# Patient Record
Sex: MALE | Race: WHITE | NOT HISPANIC OR LATINO | Employment: OTHER | URBAN - METROPOLITAN AREA
[De-identification: names, ages, dates, MRNs, and addresses within clinical notes are randomized per-mention and may not be internally consistent; named-entity substitution may affect disease eponyms.]

---

## 2023-10-06 PROBLEM — D22.39 MELANOCYTIC NEVI OF OTHER PARTS OF FACE: Status: ACTIVE | Noted: 2023-08-01

## 2023-10-06 PROBLEM — B07.9 VIRAL WART, UNSPECIFIED: Status: ACTIVE | Noted: 2023-08-01

## 2023-10-06 PROBLEM — D48.5 NEOPLASM OF UNCERTAIN BEHAVIOR OF SKIN: Status: ACTIVE | Noted: 2023-08-01

## 2023-10-06 RX ORDER — IMIQUIMOD 12.5 MG/.25G
1 CREAM TOPICAL
COMMUNITY
Start: 2020-07-07

## 2023-10-10 ENCOUNTER — APPOINTMENT (OUTPATIENT)
Dept: DERMATOLOGY | Facility: CLINIC | Age: 63
End: 2023-10-10
Payer: COMMERCIAL

## 2023-10-11 ENCOUNTER — TELEPHONE (OUTPATIENT)
Dept: DERMATOLOGY | Facility: CLINIC | Age: 63
End: 2023-10-11
Payer: COMMERCIAL

## 2023-10-30 NOTE — PROGRESS NOTES
Subjective     Duncan Kay is a 63 y.o. male who presents for the following: Wart (Here for additional Vbeam treatment. Patient reports improvement since last visit. ).   Acral keratosis vs viral warts, responding well to v-beam treatments.   He is planned to see genetics for workup of possible cowdens syndrome.     Review of Systems:  No other skin or systemic complaints other than what is documented elsewhere in the note.    The following portions of the chart were reviewed this encounter and updated as appropriate:          Skin Cancer History  No skin cancer on file.      Specialty Problems          Dermatology Problems    Melanocytic nevi of other parts of face    Neoplasm of uncertain behavior of skin    Viral wart, unspecified       Previous V-beam Treatment  Date: August 1, 2023  Treatment #:  16  Location: hands  Spot Size: 7 mm  Energy:  16.25  j/cm2  Pulse Width:  1.5  msec  DCD Spray:  OFF  DCD Delay:  OFF  Number of Pulses: 10  Complications: No  Bruising: Demonstrates anticipated reactions  Cold Pack after treatment: Yes     Objective   Well appearing patient in no apparent distress; mood and affect are within normal limits.    A focused skin examination was performed. All findings within normal limits unless otherwise noted below.    Assessment/Plan   1. Other viral warts (15)  Left Hand - Anterior (9), Left Hand - Posterior (3), Right Hand - Posterior (3)  Verrucous papules    Follow Up In Dermatology - Established Patient - Left Hand - Anterior, Left Hand - Posterior    Destr of lesion - Left Hand - Anterior, Left Hand - Posterior, Right Hand - Posterior  Complexity: simple    Destruction method: laser removal    Informed consent: discussed and consent obtained    Eye shield: goggles    Laser type:  V-beam  Fluence (J/sq cm):  16  Spot size (mm):  7  Pulse duration (ms):  1.5  Pulse stacking: Yes      Pulse stacking: No    Outcome: patient tolerated procedure well with no complications     Post-procedure details: wound care instructions given

## 2023-10-31 ENCOUNTER — PROCEDURE VISIT (OUTPATIENT)
Dept: DERMATOLOGY | Facility: CLINIC | Age: 63
End: 2023-10-31
Payer: COMMERCIAL

## 2023-10-31 DIAGNOSIS — B07.8 OTHER VIRAL WARTS: Primary | ICD-10-CM

## 2023-10-31 PROCEDURE — 17111 DESTRUCTION B9 LESIONS 15/>: CPT | Performed by: STUDENT IN AN ORGANIZED HEALTH CARE EDUCATION/TRAINING PROGRAM

## 2023-10-31 RX ORDER — FLUTICASONE PROPIONATE AND SALMETEROL XINAFOATE 115; 21 UG/1; UG/1
2 AEROSOL, METERED RESPIRATORY (INHALATION) 2 TIMES DAILY
COMMUNITY
Start: 2023-07-21 | End: 2023-11-18

## 2023-10-31 RX ORDER — FLUTICASONE PROPIONATE 44 UG/1
AEROSOL, METERED RESPIRATORY (INHALATION)
COMMUNITY

## 2023-10-31 RX ORDER — OMEPRAZOLE 10 MG/1
10 CAPSULE, DELAYED RELEASE ORAL
COMMUNITY
Start: 2014-10-13

## 2023-10-31 RX ORDER — FLUTICASONE PROPIONATE 50 MCG
2 SPRAY, SUSPENSION (ML) NASAL DAILY
COMMUNITY
Start: 2023-05-04

## 2023-10-31 ASSESSMENT — DERMATOLOGY PATIENT ASSESSMENT
HAVE YOU HAD OR DO YOU HAVE A STAPH INFECTION: NO
HAVE YOU HAD OR DO YOU HAVE VASCULAR DISEASE: NO
DO YOU USE A TANNING BED: NO
DO YOU HAVE ANY NEW OR CHANGING LESIONS: NO
DO YOU USE SUNSCREEN: OCCASIONALLY
ARE YOU AN ORGAN TRANSPLANT RECIPIENT: NO

## 2023-10-31 ASSESSMENT — DERMATOLOGY QUALITY OF LIFE (QOL) ASSESSMENT
RATE HOW EMOTIONALLY BOTHERED YOU ARE BY YOUR SKIN PROBLEM (FOR EXAMPLE, WORRY, EMBARRASSMENT, FRUSTRATION): 0 - NEVER BOTHERED
WHAT SINGLE SKIN CONDITION LISTED BELOW IS THE PATIENT ANSWERING THE QUALITY-OF-LIFE ASSESSMENT QUESTIONS ABOUT: NONE OF THE ABOVE
RATE HOW BOTHERED YOU ARE BY EFFECTS OF YOUR SKIN PROBLEMS ON YOUR ACTIVITIES (EG, GOING OUT, ACCOMPLISHING WHAT YOU WANT, WORK ACTIVITIES OR YOUR RELATIONSHIPS WITH OTHERS): 0 - NEVER BOTHERED
DATE THE QUALITY-OF-LIFE ASSESSMENT WAS COMPLETED: 66778
ARE THERE EXCLUSIONS OR EXCEPTIONS FOR THE QUALITY OF LIFE ASSESSMENT: NO
RATE HOW BOTHERED YOU ARE BY SYMPTOMS OF YOUR SKIN PROBLEM (EG, ITCHING, STINGING BURNING, HURTING OR SKIN IRRITATION): 0 - NEVER BOTHERED

## 2023-10-31 ASSESSMENT — PATIENT GLOBAL ASSESSMENT (PGA): PATIENT GLOBAL ASSESSMENT: PATIENT GLOBAL ASSESSMENT:  1 - CLEAR

## 2023-10-31 ASSESSMENT — ITCH NUMERIC RATING SCALE: HOW SEVERE IS YOUR ITCHING?: 0

## 2023-11-30 ENCOUNTER — PROCEDURE VISIT (OUTPATIENT)
Dept: DERMATOLOGY | Facility: CLINIC | Age: 63
End: 2023-11-30
Payer: COMMERCIAL

## 2023-11-30 ENCOUNTER — APPOINTMENT (OUTPATIENT)
Dept: LAB | Facility: LAB | Age: 63
End: 2023-11-30
Payer: COMMERCIAL

## 2023-11-30 DIAGNOSIS — B07.9 VIRAL WARTS, UNSPECIFIED TYPE: ICD-10-CM

## 2023-11-30 DIAGNOSIS — B07.8 OTHER VIRAL WARTS: ICD-10-CM

## 2023-11-30 PROCEDURE — 17110 DESTRUCTION B9 LES UP TO 14: CPT | Performed by: STUDENT IN AN ORGANIZED HEALTH CARE EDUCATION/TRAINING PROGRAM

## 2023-11-30 NOTE — PROGRESS NOTES
Subjective     Duncan Kay is a 63 y.o. male who presents for the following: Wart (Follow up warts on b/l hands.  Has noticed improvement from laser treatments.).   Acral keratosis vs viral warts, responding well to v-beam treatments.     Review of Systems:  No other skin or systemic complaints other than what is documented elsewhere in the note.    The following portions of the chart were reviewed this encounter and updated as appropriate:          Skin Cancer History  No skin cancer on file.      Specialty Problems          Dermatology Problems    Melanocytic nevi of other parts of face    Neoplasm of uncertain behavior of skin    Viral wart, unspecified       Previous V-beam Treatment  Location: hands  Spot Size: 7 mm  Energy:  16.25  j/cm2  Pulse Width:  1.5  msec  DCD Spray:  OFF  DCD Delay:  OFF  Number of Pulses: 10  Complications: No  Bruising: Demonstrates anticipated reactions  Cold Pack after treatment: Yes     Objective   Well appearing patient in no apparent distress; mood and affect are within normal limits.    A focused skin examination was performed. All findings within normal limits unless otherwise noted below.    Assessment/Plan   1. Other viral warts    Related Procedures  Follow Up In Dermatology - Established Patient  Follow Up In Dermatology - Established Patient    2. Viral warts, unspecified type (2)  Left Dorsal Hand; Right Dorsal Hand    Destr of lesion - Left Dorsal Hand, Right Dorsal Hand  Complexity: simple    Destruction method: laser removal    Informed consent: discussed and consent obtained    Timeout:  patient name, date of birth, surgical site, and procedure verified  Eye shield: goggles    Laser type:  V Beam  Fluence (J/sq cm):  16  Spot size (mm):  7  Pulse duration (ms):  1.5  Outcome: patient tolerated procedure well with no complications    Post-procedure details: wound care instructions given    Additional details:  Pulses: 15

## 2024-02-22 ENCOUNTER — OFFICE VISIT (OUTPATIENT)
Dept: DERMATOLOGY | Facility: CLINIC | Age: 64
End: 2024-02-22
Payer: COMMERCIAL

## 2024-02-22 DIAGNOSIS — B07.8 OTHER VIRAL WARTS: ICD-10-CM

## 2024-02-22 PROCEDURE — 17110 DESTRUCTION B9 LES UP TO 14: CPT | Performed by: STUDENT IN AN ORGANIZED HEALTH CARE EDUCATION/TRAINING PROGRAM

## 2024-02-22 PROCEDURE — 1036F TOBACCO NON-USER: CPT | Performed by: STUDENT IN AN ORGANIZED HEALTH CARE EDUCATION/TRAINING PROGRAM

## 2024-02-22 NOTE — PROGRESS NOTES
Subjective     Duncan Kay is a 63 y.o. male who presents for the following: Wart (LV: 11/30/23: warts on b/l hands R > L.  Improved greatly with laser tx's.).   Acral keratosis vs viral warts, responding well to v-beam treatments.     Review of Systems:  No other skin or systemic complaints other than what is documented elsewhere in the note.    The following portions of the chart were reviewed this encounter and updated as appropriate:          Skin Cancer History  No skin cancer on file.      Specialty Problems          Dermatology Problems    Melanocytic nevi of other parts of face    Neoplasm of uncertain behavior of skin    Viral wart, unspecified       Previous V-beam Treatment  Location: hands  Spot Size: 7 mm  Energy:  16.25  j/cm2  Pulse Width:  1.5  msec  DCD Spray:  OFF  DCD Delay:  OFF  Number of Pulses: 10  Complications: No  Bruising: Demonstrates anticipated reactions  Cold Pack after treatment: Yes     Objective   Well appearing patient in no apparent distress; mood and affect are within normal limits.    A focused skin examination was performed. All findings within normal limits unless otherwise noted below.    Assessment/Plan   1. Other viral warts (4)  Left Dorsal Hand, Right 5th Finger Metacarpophalangeal Joint, Right 5th Metacarpophalangeal Region, Right Hypothenar [place]  Verrucous papules, thinner than prior exam    Destr of lesion - Left Dorsal Hand, Right 5th Finger Metacarpophalangeal Joint, Right 5th Metacarpophalangeal Region, Right Hypothenar [place]  Complexity: simple    Destruction method: laser removal    Informed consent: discussed and consent obtained    Eye shield: goggles    Laser type:  V-beam  Fluence (J/sq cm):  16.25  Spot size (mm):  7  Pulse duration (ms):  1.5  Pulse stacking: Yes      Pulse stacking: No    Outcome: patient tolerated procedure well with no complications    Post-procedure details: wound care instructions given      Related Procedures  Follow Up In  Dermatology - Established Patient  Follow Up In Dermatology - Established Patient

## 2024-04-01 NOTE — PROGRESS NOTES
Subjective   Duncan Kay is a 63 y.o. male who presents for the following: Wart (LV: 2/22/24: Acral Keratosis vs viral warts.  Pt has had much improvement with vbeam treatments.)    The following portions of the chart were reviewed this encounter and updated as appropriate:         Review of Systems: No other skin or systemic complaints.    Objective   Well appearing patient in no apparent distress; mood and affect are within normal limits.    A focused examination was performed including upper extremities, including the arms, hands, fingers, and fingernails. All findings within normal limits unless otherwise noted below.      Assessment/Plan   Other viral warts (4)  Left Dorsal Hand; Right Thenar [place]; Right 5th Finger Metacarpophalangeal Joint; Right 5th Metacarpophalangeal Region    Follow Up In Dermatology - Established Patient - Left Dorsal Hand, Right 5th Finger Metacarpophalangeal Joint, Right 5th Metacarpophalangeal Region, Right Thenar [place]    Destr of lesion - Left Dorsal Hand, Right 5th Finger Metacarpophalangeal Joint, Right 5th Metacarpophalangeal Region, Right Thenar [place]  Complexity: simple    Destruction method: laser removal    Informed consent: discussed and consent obtained    Timeout:  patient name, date of birth, surgical site, and procedure verified  Eye shield: goggles    Laser type:  VBeam  Fluence (J/sq cm):  16  Spot size (mm):  7  Pulse duration (ms):  1.5  Pulse stacking: Yes      Pulse stacking: No    Outcome: patient tolerated procedure well with no complications    Post-procedure details: wound care instructions given    Additional details:  16 pulses    Scribe Attestation  By signing my name below, Edith MANCERA LPN , Scribe   attest that this documentation has been prepared under the direction and in the presence of Cricket Ziegler MD.

## 2024-04-02 ENCOUNTER — OFFICE VISIT (OUTPATIENT)
Dept: DERMATOLOGY | Facility: CLINIC | Age: 64
End: 2024-04-02
Payer: COMMERCIAL

## 2024-04-02 DIAGNOSIS — B07.8 OTHER VIRAL WARTS: ICD-10-CM

## 2024-04-02 PROCEDURE — 17110 DESTRUCTION B9 LES UP TO 14: CPT | Performed by: STUDENT IN AN ORGANIZED HEALTH CARE EDUCATION/TRAINING PROGRAM

## 2024-07-08 NOTE — PROGRESS NOTES
Subjective   Duncan Kay is a 63 y.o. male who presents for the following: Follow-up (LV: 4/2/24: Acral Keratosis vs viral warts.  Pt has had much improvement with vbeam treatments.) and Suspicious Skin Lesion (Right forehead.  Unsure how long this has been present but is not bothersome.)    The following portions of the chart were reviewed this encounter and updated as appropriate:         Review of Systems: No other skin or systemic complaints.    Objective   Well appearing patient in no apparent distress; mood and affect are within normal limits.    A focused examination was performed including upper extremities, including the arms, hands, fingers, and fingernails. All findings within normal limits unless otherwise noted below.    Right Forehead  Stuck on verrucous, tan-brown papule      Left Hand - Anterior, Right Hand - Anterior  Verrucous papules on acral surfaces of hands      Assessment/Plan   Seborrheic keratosis  Right Forehead    Reassured of benign nature of lesion    Other viral warts (2)  Left Hand - Anterior; Right Hand - Anterior    Reassured of benign nature of lesions  He would like repeat tx today    Follow Up In Dermatology - Established Patient - Left Hand - Anterior, Right Hand - Anterior    Destr of lesion - Left Hand - Anterior, Right Hand - Anterior  Complexity: simple    Destruction method: laser removal    Informed consent: discussed and consent obtained    Eye shield: goggles    Laser type:  V-beam  Fluence (J/sq cm):  16  Spot size (mm):  7  Pulse duration (ms):  1.5  Pulse stacking: Yes      Pulse stacking: No    Outcome: patient tolerated procedure well with no complications    Post-procedure details: wound care instructions given    Additional details:  16 pulses          RTC 4-6 weeks    Scribe Attestation  By signing my name below, Edith MANCERA LPN , Scribe   attest that this documentation has been prepared under the direction and in the presence of Cricket Ziegler MD.

## 2024-07-09 ENCOUNTER — APPOINTMENT (OUTPATIENT)
Dept: DERMATOLOGY | Facility: CLINIC | Age: 64
End: 2024-07-09
Payer: COMMERCIAL

## 2024-07-09 DIAGNOSIS — B07.8 OTHER VIRAL WARTS: ICD-10-CM

## 2024-07-09 DIAGNOSIS — L82.1 SEBORRHEIC KERATOSIS: Primary | ICD-10-CM

## 2024-07-09 PROCEDURE — 99213 OFFICE O/P EST LOW 20 MIN: CPT | Performed by: STUDENT IN AN ORGANIZED HEALTH CARE EDUCATION/TRAINING PROGRAM

## 2024-07-09 PROCEDURE — 17110 DESTRUCTION B9 LES UP TO 14: CPT | Performed by: STUDENT IN AN ORGANIZED HEALTH CARE EDUCATION/TRAINING PROGRAM

## 2024-07-09 ASSESSMENT — PATIENT GLOBAL ASSESSMENT (PGA): WHAT IS THE PGA: PATIENT GLOBAL ASSESSMENT:  1 - CLEAR

## 2024-07-09 ASSESSMENT — DERMATOLOGY QUALITY OF LIFE (QOL) ASSESSMENT
RATE HOW BOTHERED YOU ARE BY EFFECTS OF YOUR SKIN PROBLEMS ON YOUR ACTIVITIES (EG, GOING OUT, ACCOMPLISHING WHAT YOU WANT, WORK ACTIVITIES OR YOUR RELATIONSHIPS WITH OTHERS): 0 - NEVER BOTHERED
RATE HOW EMOTIONALLY BOTHERED YOU ARE BY YOUR SKIN PROBLEM (FOR EXAMPLE, WORRY, EMBARRASSMENT, FRUSTRATION): 0 - NEVER BOTHERED
WHAT SINGLE SKIN CONDITION LISTED BELOW IS THE PATIENT ANSWERING THE QUALITY-OF-LIFE ASSESSMENT QUESTIONS ABOUT: NONE OF THE ABOVE
RATE HOW EMOTIONALLY BOTHERED YOU ARE BY YOUR SKIN PROBLEM (FOR EXAMPLE, WORRY, EMBARRASSMENT, FRUSTRATION): 0 - NEVER BOTHERED
RATE HOW BOTHERED YOU ARE BY SYMPTOMS OF YOUR SKIN PROBLEM (EG, ITCHING, STINGING BURNING, HURTING OR SKIN IRRITATION): 0 - NEVER BOTHERED
RATE HOW BOTHERED YOU ARE BY EFFECTS OF YOUR SKIN PROBLEMS ON YOUR ACTIVITIES (EG, GOING OUT, ACCOMPLISHING WHAT YOU WANT, WORK ACTIVITIES OR YOUR RELATIONSHIPS WITH OTHERS): 0 - NEVER BOTHERED
WHAT SINGLE SKIN CONDITION LISTED BELOW IS THE PATIENT ANSWERING THE QUALITY-OF-LIFE ASSESSMENT QUESTIONS ABOUT: NONE OF THE ABOVE
DATE THE QUALITY-OF-LIFE ASSESSMENT WAS COMPLETED: 66939

## 2024-08-08 ENCOUNTER — APPOINTMENT (OUTPATIENT)
Dept: DERMATOLOGY | Facility: CLINIC | Age: 64
End: 2024-08-08
Payer: COMMERCIAL

## 2024-08-08 ENCOUNTER — TELEPHONE (OUTPATIENT)
Dept: DERMATOLOGY | Facility: CLINIC | Age: 64
End: 2024-08-08

## 2024-08-08 NOTE — TELEPHONE ENCOUNTER
LVM for pt to notify of power outage in [place].  Offered to move appointment to [facility] this afternoon or reschedule.  Phone number provided for office. Edith Pittman LPN

## 2024-08-08 NOTE — TELEPHONE ENCOUNTER
Spoke with pt and he prefers to reschedule appt in [place].  Rescheduled to Tuesday 9/3/24 at 1pm.  No further questions or concerns at this time.  Eidth Pittman LPN

## 2024-09-03 ENCOUNTER — APPOINTMENT (OUTPATIENT)
Dept: DERMATOLOGY | Facility: CLINIC | Age: 64
End: 2024-09-03
Payer: COMMERCIAL

## 2024-09-03 DIAGNOSIS — B07.8 OTHER VIRAL WARTS: ICD-10-CM

## 2024-09-03 PROCEDURE — 17110 DESTRUCTION B9 LES UP TO 14: CPT | Performed by: STUDENT IN AN ORGANIZED HEALTH CARE EDUCATION/TRAINING PROGRAM

## 2024-09-03 NOTE — PROGRESS NOTES
Subjective   Duncan Kay is a 63 y.o. male who presents for the following: Wart (LV: 7/9/24: Acral Keratosis vs viral warts.  Pt has had much improvement with V-beam treatments.)    The following portions of the chart were reviewed this encounter and updated as appropriate:         Review of Systems: No other skin or systemic complaints.    Objective   Well appearing patient in no apparent distress; mood and affect are within normal limits.    A focused examination was performed including upper extremities, including the arms, hands, fingers, and fingernails. All findings within normal limits unless otherwise noted below.    Left Hand - Anterior, Right Hand - Anterior  Verrucous papules on acral surfaces of hands      Assessment/Plan   Other viral warts (2)  Left Hand - Anterior; Right Hand - Anterior    Reassured of benign nature of lesions  He would like repeat tx today    Destr of lesion - Left Hand - Anterior, Right Hand - Anterior    Destruction method: laser removal    Informed consent: discussed and consent obtained    Timeout:  patient name, date of birth, surgical site, and procedure verified  Procedure prep:  Patient was prepped and draped  Eye shield: goggles    Laser type:  VBeam  Fluence (J/sq cm):  16  Spot size (mm):  7  Pulse duration (ms):  1.5  Pulse stacking: Yes      Pulse stacking: No    Outcome: patient tolerated procedure well with no complications    Post-procedure details: wound care instructions given    Additional details:  14 Pulses    Related Procedures  Follow Up In Dermatology - Established Patient    RTC 4-6 weeks    Scribe Attestation  By signing my name below, IEdith LPN , Scribe   attest that this documentation has been prepared under the direction and in the presence of Cricket Ziegler MD.

## 2024-10-08 NOTE — PROGRESS NOTES
Subjective   Duncan Kay is a 64 y.o. male who presents for the following: Wart (LV: 9/3/24: Acral Keratosis vs viral warts.  Pt has had much improvement with V-beam treatments) and Suspicious Skin Lesion (Left scalp lesion noticed a few days ago.  No pain or itching.  Noticed bleeding this morning.  No prior trauma.)    Skin Cancer History  None    Family History of Skin Cancer  Father - unsure what type    The following portions of the chart were reviewed this encounter and updated as appropriate:         Review of Systems: No other skin or systemic complaints.    Objective   Well appearing patient in no apparent distress; mood and affect are within normal limits.    A focused examination was performed including upper extremities, including the arms, hands, fingers, and fingernails and head, including the scalp, face, neck, nose, ears, eyelids, and lips. All findings within normal limits unless otherwise noted below.    Mid Parietal Scalp  Pink eroded crusted papule              Left Hand - Anterior, Right Hand - Anterior  Verrucous papules on acral surfaces of hands      Assessment/Plan   Neoplasm of uncertain behavior of skin  Mid Parietal Scalp    Lesion biopsy  Type of biopsy: tangential    Informed consent: discussed and consent obtained    Timeout: patient name, date of birth, surgical site, and procedure verified    Procedure prep:  Patient was prepped and draped  Anesthesia: the lesion was anesthetized in a standard fashion    Anesthetic:  1% lidocaine w/ epinephrine 1-100,000 local infiltration  Instrument used: DermaBlade    Hemostasis achieved with: aluminum chloride    Outcome: patient tolerated procedure well    Post-procedure details: sterile dressing applied and wound care instructions given    Dressing type: petrolatum and bandage      Staff Communication: Dermatology Local Anesthesia: 1 % Lidocaine / Epinephrine - Amount: 0.2cc    Specimen 1 - Dermatopathology- DERM LAB  Differential Diagnosis:  traumatized benign nevus vs benign adnexal tumor vs NMSC  Check Margins Yes/No?:    Comments:    Dermpath Lab: Routine Histopathology (formalin-fixed tissue)    -Discussed uncertain diagnosis of lesion and recommended biopsy to help clarify diagnosis. -Discussed R/B of procedure including risk of scar. Patient verbalized understanding and agreement with plan for biopsy today.    Other viral warts (2)  Left Hand - Anterior; Right Hand - Anterior    Reassured of benign nature of lesions  He would like repeat tx today    Destr of lesion - Left Hand - Anterior, Right Hand - Anterior    Destruction method: laser removal    Informed consent: discussed and consent obtained    Timeout:  patient name, date of birth, surgical site, and procedure verified  Eye shield: goggles    Laser type:  Vbearm  Fluence (J/sq cm):  16  Spot size (mm):  7  Pulse duration (ms):  1.5  Pulse stacking: Yes      Pulse stacking: No    Outcome: patient tolerated procedure well with no complications    Post-procedure details: wound care instructions given    Additional details:  26 Pulses    Related Procedures  Follow Up In Dermatology - Established Patient  Follow Up In Dermatology - Established Patient    RTC 4-6 weeks    Scribe Attestation  By signing my name below, I, Edith Pittman LPN , Scribe   attest that this documentation has been prepared under the direction and in the presence of Cricket Ziegler MD.

## 2024-10-09 ENCOUNTER — APPOINTMENT (OUTPATIENT)
Dept: DERMATOLOGY | Facility: CLINIC | Age: 64
End: 2024-10-09
Payer: COMMERCIAL

## 2024-10-09 DIAGNOSIS — B07.8 OTHER VIRAL WARTS: ICD-10-CM

## 2024-10-09 DIAGNOSIS — D48.5 NEOPLASM OF UNCERTAIN BEHAVIOR OF SKIN: Primary | ICD-10-CM

## 2024-10-09 PROCEDURE — 11102 TANGNTL BX SKIN SINGLE LES: CPT | Performed by: STUDENT IN AN ORGANIZED HEALTH CARE EDUCATION/TRAINING PROGRAM

## 2024-10-09 PROCEDURE — 17110 DESTRUCTION B9 LES UP TO 14: CPT | Performed by: STUDENT IN AN ORGANIZED HEALTH CARE EDUCATION/TRAINING PROGRAM

## 2024-10-09 PROCEDURE — 99213 OFFICE O/P EST LOW 20 MIN: CPT | Performed by: STUDENT IN AN ORGANIZED HEALTH CARE EDUCATION/TRAINING PROGRAM

## 2024-10-09 NOTE — PATIENT INSTRUCTIONS
Department of Dermatology    Daily Care of your Biopsy Site/Excision/ ED&C      If a specimen was sent to pathology, when your biopsy result is ready it will be sent to you and your medical care team at the exact same time. Please give your medical care team up to 3 business days to review the results and communicate the plan to you. If additional scheduling is needed, it may take the  an additional 2-3 weeks to contact you with the appropriate scheduling.     Leave the initial bandage on for 24 hours.  Keep the area dry.  After 24 hours, remove bandage, clean the area gently with mild soap and water in the shower.  Try to remove any crust that may have formed.  Pat dry.    Apply a thin layer of Vaseline ointment and cover with a Band-Aid.   Continue daily until stitches are removed or until the area has healed.     Discomfort: If you experience discomfort, you may take Tylenol (Acetaminophen) or Extra Strength Tylenol. If you don't have any allergies and are able to take Tylenol, take one to two tablets as directed on the bottle.   Bleeding: If bleeding occurs, do not remove the bandage.  Apply continuous firm pressure with gauze for 15 to 20 minutes with no peeking. If the bleeding persists, repeat the pressure for an additional 20 minutes.  If bleeding continues, you should notify us immediately.  If the office is closed, call [phone] and ask to page the dermatology resident doctor on call.  The resident on call will advise you with instructions or determine if you need to go to your nearest emergency room.  PLEASE do not go the emergency room without attempting to contact us first. Please notify us of any bleeding, as you will most likely need to have a bandage change.    Infection: Some soreness and redness is expected. It is normal to develop a red ring around the area and yellow leakage. Do not be alarmed. If the area becomes very  sore, red, and/or hot to the touch, or you have a fever, please notify us.  It is possible the area may be infected.     *If you have any questions or difficulties, please contact us.   Weekdays call [phone]  Evenings and Weekends call [phone] and ask to page the dermatology resident doctor on call.

## 2024-10-11 LAB
LABORATORY COMMENT REPORT: NORMAL
PATH REPORT.FINAL DX SPEC: NORMAL
PATH REPORT.GROSS SPEC: NORMAL
PATH REPORT.MICROSCOPIC SPEC OTHER STN: NORMAL
PATH REPORT.RELEVANT HX SPEC: NORMAL
PATH REPORT.TOTAL CANCER: NORMAL

## 2024-10-16 DIAGNOSIS — C44.41 BASAL CELL CARCINOMA (BCC) OF SCALP: ICD-10-CM

## 2024-10-17 NOTE — RESULT ENCOUNTER NOTE
Spoke with pt and reviewed results and mohs referral in detail.  Pt verbalized understanding and has no further questions.  Edith Pittman LPN

## 2024-11-05 ENCOUNTER — OFFICE VISIT (OUTPATIENT)
Dept: DERMATOLOGY | Facility: CLINIC | Age: 64
End: 2024-11-05
Payer: COMMERCIAL

## 2024-11-05 DIAGNOSIS — B07.8 OTHER VIRAL WARTS: ICD-10-CM

## 2024-11-05 PROCEDURE — 17110 DESTRUCTION B9 LES UP TO 14: CPT | Performed by: STUDENT IN AN ORGANIZED HEALTH CARE EDUCATION/TRAINING PROGRAM

## 2024-11-05 NOTE — PROGRESS NOTES
Subjective   Duncan Kay is a 64 y.o. male who presents for the following: Wart (LV: 10/9/24: acral keratosis vs viral warts to b/l hands. Pt notes improvement to hands.)    The following portions of the chart were reviewed this encounter and updated as appropriate:         Review of Systems: Pertinent items are noted in HPI.    Objective   Well appearing patient in no apparent distress; mood and affect are within normal limits.    A focused examination was performed including upper extremities, including the arms, hands, fingers, and fingernails. All findings within normal limits unless otherwise noted below.    Left Hand - Anterior, Right Hand - Anterior  Verrucous papules on acral and dorsal surfaces of hands; thinner than last exam      Assessment/Plan   Other viral warts (2)  Left Hand - Anterior; Right Hand - Anterior    Reassured of benign nature of lesions  He would like repeat tx today    Follow Up In Dermatology - Established Patient - Left Hand - Anterior, Right Hand - Anterior    Follow Up In Dermatology - Established Patient - Left Hand - Anterior, Right Hand - Anterior    Destr of lesion - Left Hand - Anterior, Right Hand - Anterior  Complexity: simple    Destruction method: laser removal    Informed consent: discussed and consent obtained    Eye shield: goggles    Laser type:  PDL - V-beam  Fluence (J/sq cm):  16  Spot size (mm):  7  Pulse duration (ms):  1.5  Pulse stacking: Yes      Pulse stacking: No    Outcome: patient tolerated procedure well with no complications    Post-procedure details: wound care instructions given    Additional details:  Pulses: 25    RTC 4-6 weeks    Scribe Attestation  By signing my name below, IEdith LPN , Scribe   attest that this documentation has been prepared under the direction and in the presence of Cricket Ziegler MD.

## 2024-11-12 ENCOUNTER — APPOINTMENT (OUTPATIENT)
Dept: DERMATOLOGY | Facility: CLINIC | Age: 64
End: 2024-11-12
Payer: COMMERCIAL

## 2024-12-06 ENCOUNTER — APPOINTMENT (OUTPATIENT)
Dept: DERMATOLOGY | Facility: CLINIC | Age: 64
End: 2024-12-06
Payer: COMMERCIAL

## 2024-12-20 ENCOUNTER — APPOINTMENT (OUTPATIENT)
Dept: DERMATOLOGY | Facility: CLINIC | Age: 64
End: 2024-12-20
Payer: COMMERCIAL

## 2024-12-20 VITALS — DIASTOLIC BLOOD PRESSURE: 57 MMHG | SYSTOLIC BLOOD PRESSURE: 114 MMHG | HEART RATE: 59 BPM

## 2024-12-20 DIAGNOSIS — C44.41 BASAL CELL CARCINOMA (BCC) OF SCALP: ICD-10-CM

## 2024-12-20 NOTE — PROGRESS NOTES
Mohs Surgery Operative Note    Date of Surgery:  12/20/2024  Surgeon:  Miah Camacho MD PhD  Office Location: [address] [address]  Dept: [phone]  Dept Fax: [phone]  Referring Provider: Cricket Ziegler MD  [facility], [address] [address]      Assessment/Plan   Pre-procedure:   Obtained informed consent: written from patient  The surgical site was identified and confirmed with the patient.     Intra-operative:   Audible time out called at : 3:52 PM 12/20/24  by: Denis Cordoba MA   Verified patient name, birthdate, site, specimen bottle label & requisition.    The planned procedure(s) was again reviewed with the patient. The risks of bleeding, infection, nerve damage and scarring were reviewed. Written authorization was obtained. The patient identity, surgical site, and planned procedure(s) were verified. The provider acted as both surgeon and pathologist.     Basal cell carcinoma (BCC) of scalp  Mid Parietal Scalp    Mohs surgery    Consent obtained: written    Universal Protocol:  Procedure explained and questions answered to patient or proxy's satisfaction: Yes    Test results available and properly labeled: Yes    Pathology report reviewed: Yes    External notes reviewed: Yes    Photo or diagram used for site identification: Yes    Site/side marked: Yes    Slide independently reviewed by Mohs surgeon: Yes    Immediately prior to procedure a time out was called: Yes    Patient identity confirmed: verbally with patient  Preparation: Patient was prepped and draped in usual sterile fashion      Anticoagulation:  Is the patient taking prescription anticoagulant and/or aspirin prescribed/recommended by a physician? No    Was the anticoagulation regimen changed prior to Mohs? No      Anesthesia:  Anesthesia method: local infiltration  Local anesthetic: lidocaine 1% WITH epi    Procedure Details:  Case ID Number:  [id number]  Biopsy accession number: [id number]  Date of biopsy: 10/9/2024  Frozen section biopsy performed: No    Specimen debulked: Yes (NBCC)    Pre-Op diagnosis: basal cell carcinoma  BCC subtype: nodular and infiltrative  Surgical site (from skin exam): Mid Parietal Scalp  Pre-operative length (cm): 1.5  Pre-operative width (cm): 0.7  Indications for Mohs surgery: anatomic location where tissue conservation is critical  Previously treated? No      Micrographic Surgery Details:  Post-operative length (cm): 2.5  Post-operative width (cm): 1.5  Number of Mohs stages: 1    Stage 1     Comments: The patient was brought into the operating room and placed in the procedure chair in the appropriate position.  The area positive by previous biopsy was identified and confirmed with the patient. The area of clinically obvious tumor was debulked using a curette and/or scalpel as needed. An incision was made following the Mohs approach through the skin. The specimen was taken to the lab, divided into 2 piece(s) and appropriately chromacoded and processed.                 Tumor features identified on Mohs section: no tumor identified    Depth of defect: subcutaneous fat    Patient tolerance of procedure: tolerated well, no immediate complications    Reconstruction:  Was the defect reconstructed? Yes    Was reconstruction performed by the same Mohs surgeon? Yes    Setting of reconstruction: outpatient office  When was reconstruction performed? same day  Type of reconstruction: linear  Linear reconstruction: complex  Length of linear repair (cm): 5.5  Fine/surface layer approximation (top stitches)   Epidermal/Superficial suture size:  3-0  Epidermal/Superficial suture type:  Prolene  Stitches: simple running    Hemostasis achieved with: electrodesiccation  Outcome: patient tolerated procedure well with no complications    Post-procedure details: sterile dressing applied and wound care instructions given    Dressing type: pressure  dressing          Simple Linear Repair - Given the location and size of the defect, it was determined that a simple layered linear closure was required to restore normal anatomy and function. Standing cutaneous cones were removed using Burow's triangles. The wound edges were brought into close approximation with epidermal top sutures.        The final repair measured 5.5 cm    Wound care was discussed, and the patient was given written post-operative wound care instructions.      The patient will follow up with Miah Camacho MD PhD as needed for any post operative problems or concerns, and will follow up with their primary dermatologist as scheduled.

## 2024-12-20 NOTE — PROGRESS NOTES
Office Visit Note  Date: 12/20/2024  Surgeon:  Miah Camacho MD PhD  Office Location: [address] [address]  Dept: [phone]  Dept Fax: [phone]  Referring Provider: Cricket Ziegler MD  [address] [place] [address]  [address], [address] [place] [address]    Subjective   Duncan Kay is a 64 y.o. male who presents for the following: MOHS Surgery (Mid Parietal Scalp, BCC)    According to the patient, the lesion has been present for approximately 6 months at the time of diagnosis.  The lesion is itchy.  The lesion has not been treated previously.    The patient does not have a pacemaker / defibrillator.  The patient does not have a heart valve / joint replacement.    The patient is not on blood thinners.  The patient does not have a history of hepatitis B or C.  The patient does not have a history of HIV.  The patient does not have a history of immunosuppression (e.g. organ transplantation, malignancy, medications)    Review of Systems:  No other skin or systemic complaints other than what is documented elsewhere in the note.    MEDICAL HISTORY: clinically relevant history including significant past medical history, medications and allergies was reviewed and documented in Epic.    Objective   Well appearing patient in no apparent distress; mood and affect are within normal limits.  Vital signs: See record.  Noted on the Mid Parietal Scalp  Is a 1.5 x 0.7 cm scar                      The patient confirmed the identified site.    Discussion:  The nature of the diagnosis was explained. The lesion is a skin cancer.  It has a risk of local growth and distant spread. The condition is associated with sun exposure.  Warning signs of non-melanoma skin cancer discussed. Patient was instructed to perform monthly self skin examination.  We recommended that the patient have regular full skin exams given an increased risk of subsequent skin cancers. The patient was instructed  to use sun protective behaviors including use of broad spectrum sunscreens and sun protective clothing to reduce risk of skin cancers.      Risks, benefits, side effects of Mohs surgery were discussed with patient and the patient voiced understanding.  It was explained that even though the cure rate of Mohs is very high it is not 100%. Risks of surgery including but not limited to bleeding, infection, numbness, nerve damage, and scar were reviewed.  Discussion included wound care requirements, activity restrictions, likely scar outcome and time to heal.     After Mohs surgery, the defect may need to be repaired surgically and the scar may be longer than the original lesion.  Reconstruction options, risks, and benefits were reviewed including second intention healing, linear repair (4-1 ratio was explained), local flaps, skin grafts, cartilage grafts and interpolation flaps (the need for multiple surgeries was explained). Possible outcomes were reviewed including likely scar appearance, failure of flap survival, infection, bleeding and the need for revision surgery.     The pathology was reviewed, the photograph was reviewed, and the referring physician's note was reviewed.    Patient elected for Mohs surgery.

## 2025-01-07 ENCOUNTER — APPOINTMENT (OUTPATIENT)
Dept: DERMATOLOGY | Facility: CLINIC | Age: 65
End: 2025-01-07
Payer: COMMERCIAL

## 2025-01-07 ENCOUNTER — OFFICE VISIT (OUTPATIENT)
Dept: DERMATOLOGY | Facility: CLINIC | Age: 65
End: 2025-01-07
Payer: COMMERCIAL

## 2025-01-07 DIAGNOSIS — Z48.02 VISIT FOR SUTURE REMOVAL: ICD-10-CM

## 2025-01-07 PROCEDURE — 99024 POSTOP FOLLOW-UP VISIT: CPT | Performed by: DERMATOLOGY

## 2025-01-07 NOTE — PROGRESS NOTES
Office Follow Up Note    Visit Summary  Chief Complaint    1. Complaint Wound check.    Duncan Kay is a 64 y.o. male who presents for 3 week follow up after surgery for a basal cell carcinoma. The patient has no concerns today.     Location Operation site location: mid parietal scalp    On exam,  Mr. Kay is well-appearing and in no apparent distress. The surgical site appears clean with minimal to no erythema. No tenderness and good wound edge apposition.    Assessment and Plan:  The patient was reassured that the wound is healing appropriately.   Sutures were removed without complication today.  The dressing was removed, the wound cleaned a a new dressing reapplied.     The patient was instructed to call with any further concerns. The patient will return as needed.

## 2025-02-10 NOTE — PROGRESS NOTES
Subjective   Duncan Kay is a 64 y.o. male who presents for the following: acral keratosis vs viral warts (LV: 11/5/24. Present to b/l hands.  Pt. has had v-beam treatments with improvement.  Pt is happy with results and would like to discuss spreading visits out.)    The following portions of the chart were reviewed this encounter and updated as appropriate:         Review of Systems: Pertinent items are noted in HPI.    Objective   Well appearing patient in no apparent distress; mood and affect are within normal limits.    A focused examination was performed including hands. All findings within normal limits unless otherwise noted below.    Left Hand - Anterior, Right Hand - Anterior  Verrucous papules on acral and dorsal surfaces of hands; thinner than last exam      Assessment/Plan   Basal cell carcinoma (BCC) of scalp    Related Procedures  Follow Up In Dermatology - Established Patient    Viral warts, unspecified type    Related Procedures  Follow Up In Dermatology - Established Patient    Other viral warts (2)  Left Hand - Anterior; Right Hand - Anterior    Reassured of benign nature of lesions  He would like repeat tx today    Follow Up In Dermatology - Established Patient - Left Hand - Anterior, Right Hand - Anterior    Destr of lesion - Left Hand - Anterior, Right Hand - Anterior    Destruction method: laser removal    Informed consent: discussed and consent obtained    Timeout:  patient name, date of birth, surgical site, and procedure verified  Procedure prep:  Patient was prepped and draped  Eye shield: goggles    Laser type:  VBeam  Fluence (J/sq cm):  16  Spot size (mm):  7  Pulse duration (ms):  1.5  Pulse stacking: Yes      Pulse stacking: No    Outcome: patient tolerated procedure well with no complications    Post-procedure details: wound care instructions given      RTC 6 months for laser and next available for FBSE    Scribe Attestation  By signing my name below, IEdith LPN , Scribe    attest that this documentation has been prepared under the direction and in the presence of Cricket Lowe MD.

## 2025-02-11 ENCOUNTER — APPOINTMENT (OUTPATIENT)
Dept: DERMATOLOGY | Facility: CLINIC | Age: 65
End: 2025-02-11
Payer: COMMERCIAL

## 2025-02-11 DIAGNOSIS — C44.41 BASAL CELL CARCINOMA (BCC) OF SCALP: ICD-10-CM

## 2025-02-11 DIAGNOSIS — B07.9 VIRAL WARTS, UNSPECIFIED TYPE: ICD-10-CM

## 2025-02-11 DIAGNOSIS — B07.8 OTHER VIRAL WARTS: ICD-10-CM

## 2025-02-11 PROCEDURE — 17110 DESTRUCTION B9 LES UP TO 14: CPT | Performed by: STUDENT IN AN ORGANIZED HEALTH CARE EDUCATION/TRAINING PROGRAM

## 2025-09-09 ENCOUNTER — APPOINTMENT (OUTPATIENT)
Dept: DERMATOLOGY | Facility: CLINIC | Age: 65
End: 2025-09-09
Payer: COMMERCIAL

## 2025-09-30 ENCOUNTER — APPOINTMENT (OUTPATIENT)
Dept: DERMATOLOGY | Facility: CLINIC | Age: 65
End: 2025-09-30
Payer: COMMERCIAL

## 2025-11-11 ENCOUNTER — APPOINTMENT (OUTPATIENT)
Dept: DERMATOLOGY | Facility: CLINIC | Age: 65
End: 2025-11-11
Payer: COMMERCIAL